# Patient Record
Sex: FEMALE | Race: WHITE
[De-identification: names, ages, dates, MRNs, and addresses within clinical notes are randomized per-mention and may not be internally consistent; named-entity substitution may affect disease eponyms.]

---

## 2018-04-26 ENCOUNTER — HOSPITAL ENCOUNTER (INPATIENT)
Dept: HOSPITAL 12 - SRC | Age: 23
LOS: 3 days | Discharge: OTHER INSTITUTION | DRG: 895 | End: 2018-04-29
Attending: INTERNAL MEDICINE | Admitting: INTERNAL MEDICINE
Payer: COMMERCIAL

## 2018-04-26 ENCOUNTER — HOSPITAL ENCOUNTER (EMERGENCY)
Dept: HOSPITAL 12 - ER | Age: 23
LOS: 1 days | Discharge: HOME | End: 2018-04-27
Payer: COMMERCIAL

## 2018-04-26 VITALS — WEIGHT: 190 LBS | BODY MASS INDEX: 28.14 KG/M2 | HEIGHT: 69 IN

## 2018-04-26 VITALS — BODY MASS INDEX: 27.4 KG/M2 | WEIGHT: 185 LBS | HEIGHT: 69 IN

## 2018-04-26 DIAGNOSIS — Z88.5: ICD-10-CM

## 2018-04-26 DIAGNOSIS — Z88.8: ICD-10-CM

## 2018-04-26 DIAGNOSIS — Z91.89: ICD-10-CM

## 2018-04-26 DIAGNOSIS — F15.10: ICD-10-CM

## 2018-04-26 DIAGNOSIS — F17.210: ICD-10-CM

## 2018-04-26 DIAGNOSIS — Z80.3: ICD-10-CM

## 2018-04-26 DIAGNOSIS — Z80.1: ICD-10-CM

## 2018-04-26 DIAGNOSIS — Z79.899: ICD-10-CM

## 2018-04-26 DIAGNOSIS — Z59.1: ICD-10-CM

## 2018-04-26 DIAGNOSIS — F41.9: ICD-10-CM

## 2018-04-26 DIAGNOSIS — Z81.1: ICD-10-CM

## 2018-04-26 DIAGNOSIS — Z88.6: ICD-10-CM

## 2018-04-26 DIAGNOSIS — Z91.5: ICD-10-CM

## 2018-04-26 DIAGNOSIS — F13.10: ICD-10-CM

## 2018-04-26 DIAGNOSIS — F31.9: ICD-10-CM

## 2018-04-26 DIAGNOSIS — R10.11: ICD-10-CM

## 2018-04-26 DIAGNOSIS — G40.919: ICD-10-CM

## 2018-04-26 DIAGNOSIS — Z91.018: ICD-10-CM

## 2018-04-26 DIAGNOSIS — Z98.2: ICD-10-CM

## 2018-04-26 DIAGNOSIS — Z88.1: ICD-10-CM

## 2018-04-26 DIAGNOSIS — I15.9: ICD-10-CM

## 2018-04-26 DIAGNOSIS — F12.20: ICD-10-CM

## 2018-04-26 DIAGNOSIS — Z59.0: ICD-10-CM

## 2018-04-26 DIAGNOSIS — F13.230: Primary | ICD-10-CM

## 2018-04-26 DIAGNOSIS — F12.10: ICD-10-CM

## 2018-04-26 DIAGNOSIS — F90.9: ICD-10-CM

## 2018-04-26 DIAGNOSIS — R10.11: Primary | ICD-10-CM

## 2018-04-26 PROCEDURE — A4663 DIALYSIS BLOOD PRESSURE CUFF: HCPCS

## 2018-04-26 PROCEDURE — 80076 HEPATIC FUNCTION PANEL: CPT

## 2018-04-26 PROCEDURE — 83690 ASSAY OF LIPASE: CPT

## 2018-04-26 PROCEDURE — 81001 URINALYSIS AUTO W/SCOPE: CPT

## 2018-04-26 PROCEDURE — 96375 TX/PRO/DX INJ NEW DRUG ADDON: CPT

## 2018-04-26 PROCEDURE — A9150 MISC/EXPER NON-PRESCRIPT DRU: HCPCS

## 2018-04-26 PROCEDURE — 96361 HYDRATE IV INFUSION ADD-ON: CPT

## 2018-04-26 PROCEDURE — 99284 EMERGENCY DEPT VISIT MOD MDM: CPT

## 2018-04-26 PROCEDURE — 36415 COLL VENOUS BLD VENIPUNCTURE: CPT

## 2018-04-26 PROCEDURE — G0480 DRUG TEST DEF 1-7 CLASSES: HCPCS

## 2018-04-26 PROCEDURE — 84703 CHORIONIC GONADOTROPIN ASSAY: CPT

## 2018-04-26 PROCEDURE — 80048 BASIC METABOLIC PNL TOTAL CA: CPT

## 2018-04-26 PROCEDURE — 96374 THER/PROPH/DIAG INJ IV PUSH: CPT

## 2018-04-26 PROCEDURE — 85025 COMPLETE CBC W/AUTO DIFF WBC: CPT

## 2018-04-26 SDOH — ECONOMIC STABILITY - HOUSING INSECURITY: HOMELESSNESS: Z59.0

## 2018-04-26 SDOH — ECONOMIC STABILITY - HOUSING INSECURITY: INADEQUATE HOUSING: Z59.1

## 2018-04-27 ENCOUNTER — HOSPITAL ENCOUNTER (OUTPATIENT)
Dept: HOSPITAL 12 - RAD | Age: 23
Discharge: HOME | End: 2018-04-27
Attending: INTERNAL MEDICINE
Payer: COMMERCIAL

## 2018-04-27 VITALS — DIASTOLIC BLOOD PRESSURE: 53 MMHG | SYSTOLIC BLOOD PRESSURE: 97 MMHG

## 2018-04-27 VITALS — DIASTOLIC BLOOD PRESSURE: 90 MMHG | SYSTOLIC BLOOD PRESSURE: 142 MMHG

## 2018-04-27 VITALS — DIASTOLIC BLOOD PRESSURE: 75 MMHG | SYSTOLIC BLOOD PRESSURE: 129 MMHG

## 2018-04-27 VITALS — SYSTOLIC BLOOD PRESSURE: 122 MMHG | DIASTOLIC BLOOD PRESSURE: 62 MMHG

## 2018-04-27 DIAGNOSIS — K82.0: ICD-10-CM

## 2018-04-27 DIAGNOSIS — J98.11: ICD-10-CM

## 2018-04-27 DIAGNOSIS — G93.89: ICD-10-CM

## 2018-04-27 DIAGNOSIS — N83.201: ICD-10-CM

## 2018-04-27 DIAGNOSIS — R90.82: Primary | ICD-10-CM

## 2018-04-27 LAB
ALP SERPL-CCNC: 91 U/L (ref 50–136)
ALP SERPL-CCNC: 91 U/L (ref 50–136)
ALT SERPL W/O P-5'-P-CCNC: 17 U/L (ref 14–59)
ALT SERPL W/O P-5'-P-CCNC: 19 U/L (ref 14–59)
AMPHETAMINES UR QL SCN>1000 NG/ML: NEGATIVE
APPEARANCE UR: CLEAR
AST SERPL-CCNC: 8 U/L (ref 15–37)
AST SERPL-CCNC: 9 U/L (ref 15–37)
BARBITURATES UR QL SCN: NEGATIVE
BASOPHILS # BLD AUTO: 0.1 K/UL (ref 0–8)
BASOPHILS # BLD AUTO: 0.1 K/UL (ref 0–8)
BASOPHILS NFR BLD AUTO: 0.8 % (ref 0–2)
BASOPHILS NFR BLD AUTO: 0.9 % (ref 0–2)
BILIRUB DIRECT SERPL-MCNC: 0.1 MG/DL (ref 0–0.2)
BILIRUB SERPL-MCNC: 0.2 MG/DL (ref 0.2–1)
BILIRUB SERPL-MCNC: 0.2 MG/DL (ref 0.2–1)
BILIRUB UR QL STRIP: NEGATIVE
BUN SERPL-MCNC: 14 MG/DL (ref 7–18)
BUN SERPL-MCNC: 17 MG/DL (ref 7–18)
CHLORIDE SERPL-SCNC: 105 MMOL/L (ref 98–107)
CHLORIDE SERPL-SCNC: 107 MMOL/L (ref 98–107)
CO2 SERPL-SCNC: 27 MMOL/L (ref 21–32)
CO2 SERPL-SCNC: 27 MMOL/L (ref 21–32)
COCAINE UR QL SCN: NEGATIVE
COLOR UR: (no result)
CREAT SERPL-MCNC: 1 MG/DL (ref 0.6–1.3)
CREAT SERPL-MCNC: 1.1 MG/DL (ref 0.6–1.3)
DEPRECATED SQUAMOUS URNS QL MICRO: (no result) /HPF
EOSINOPHIL # BLD AUTO: 0.1 K/UL (ref 0–0.7)
EOSINOPHIL # BLD AUTO: 0.2 K/UL (ref 0–0.7)
EOSINOPHIL NFR BLD AUTO: 1.2 % (ref 0–7)
EOSINOPHIL NFR BLD AUTO: 2.3 % (ref 0–7)
ETHANOL SERPL-MCNC: < 3 MG/DL (ref 0–0)
GLUCOSE SERPL-MCNC: 101 MG/DL (ref 74–106)
GLUCOSE SERPL-MCNC: 115 MG/DL (ref 74–106)
GLUCOSE UR STRIP-MCNC: NEGATIVE MG/DL
HCG UR QL: NEGATIVE
HCG UR QL: NEGATIVE
HCT VFR BLD AUTO: 37.1 % (ref 31.2–41.9)
HCT VFR BLD AUTO: 40.2 % (ref 31.2–41.9)
HGB BLD-MCNC: 12.4 G/DL (ref 10.9–14.3)
HGB BLD-MCNC: 13.5 G/DL (ref 10.9–14.3)
HGB UR QL STRIP: NEGATIVE
KETONES UR STRIP-MCNC: NEGATIVE MG/DL
LEUKOCYTE ESTERASE UR QL STRIP: NEGATIVE
LIPASE SERPL-CCNC: 76 U/L (ref 73–393)
LYMPHOCYTES # BLD AUTO: 1.9 K/UL (ref 20–40)
LYMPHOCYTES # BLD AUTO: 2.2 K/UL (ref 20–40)
LYMPHOCYTES NFR BLD AUTO: 20.1 % (ref 20.5–51.5)
LYMPHOCYTES NFR BLD AUTO: 22.6 % (ref 20.5–51.5)
MAGNESIUM SERPL-MCNC: 1.9 MG/DL (ref 1.8–2.4)
MCH RBC QN AUTO: 28.2 UUG (ref 24.7–32.8)
MCH RBC QN AUTO: 28.4 UUG (ref 24.7–32.8)
MCHC RBC AUTO-ENTMCNC: 33 G/DL (ref 32.3–35.6)
MCHC RBC AUTO-ENTMCNC: 34 G/DL (ref 32.3–35.6)
MCV RBC AUTO: 84.4 FL (ref 75.5–95.3)
MCV RBC AUTO: 84.6 FL (ref 75.5–95.3)
MONOCYTES # BLD AUTO: 0.7 K/UL (ref 2–10)
MONOCYTES # BLD AUTO: 0.9 K/UL (ref 2–10)
MONOCYTES NFR BLD AUTO: 7.9 % (ref 0–11)
MONOCYTES NFR BLD AUTO: 8.6 % (ref 0–11)
NEUTROPHILS # BLD AUTO: 5.5 K/UL (ref 1.8–8.9)
NEUTROPHILS # BLD AUTO: 7.6 K/UL (ref 1.8–8.9)
NEUTROPHILS NFR BLD AUTO: 65.7 % (ref 38.5–71.5)
NEUTROPHILS NFR BLD AUTO: 69.9 % (ref 38.5–71.5)
NITRITE UR QL STRIP: NEGATIVE
OPIATES UR QL SCN: POSITIVE
PCP UR QL SCN>25 NG/ML: NEGATIVE
PH UR STRIP: 5.5 [PH] (ref 5–8)
PLATELET # BLD AUTO: 232 K/UL (ref 179–408)
PLATELET # BLD AUTO: 247 K/UL (ref 179–408)
POTASSIUM SERPL-SCNC: 3.8 MMOL/L (ref 3.5–5.1)
POTASSIUM SERPL-SCNC: 3.9 MMOL/L (ref 3.5–5.1)
PROT UR QL STRIP: NEGATIVE
RBC # BLD AUTO: 4.4 MIL/UL (ref 3.63–4.92)
RBC # BLD AUTO: 4.76 MIL/UL (ref 3.63–4.92)
RBC #/AREA URNS HPF: (no result) /HPF (ref 0–3)
SP GR UR STRIP: 1.01 (ref 1–1.03)
THC UR QL SCN>50 NG/ML: POSITIVE
TSH SERPL DL<=0.005 MIU/L-ACNC: 2.32 MIU/ML (ref 0.36–3.74)
UROBILINOGEN UR STRIP-MCNC: 0.2 E.U./DL
WBC # BLD AUTO: 10.8 K/UL (ref 3.8–11.8)
WBC # BLD AUTO: 8.4 K/UL (ref 3.8–11.8)
WBC #/AREA URNS HPF: (no result) /HPF
WBC #/AREA URNS HPF: (no result) /HPF (ref 0–3)
WS STN SPEC: 6.8 G/DL (ref 6.4–8.2)
WS STN SPEC: 7.6 G/DL (ref 6.4–8.2)

## 2018-04-27 PROCEDURE — HZ2ZZZZ DETOXIFICATION SERVICES FOR SUBSTANCE ABUSE TREATMENT: ICD-10-PCS | Performed by: INTERNAL MEDICINE

## 2018-04-27 RX ADMIN — QUETIAPINE SCH MG: 200 TABLET, FILM COATED ORAL at 21:33

## 2018-04-27 RX ADMIN — DIVALPROEX SODIUM SCH MG: 500 TABLET, DELAYED RELEASE ORAL at 21:33

## 2018-04-27 RX ADMIN — FOLIC ACID SCH MG: 1 TABLET ORAL at 10:12

## 2018-04-27 RX ADMIN — THERA TABS SCH UDTAB: TAB at 10:12

## 2018-04-27 NOTE — NUR
IV INSERT

Nurses from ER insert 20 gauge IV to left forearm for CT of abdomen/pelvis with contrast. 
Site is intact and patent.

## 2018-04-27 NOTE — NUR
Per [facility] staff, they will be sending and "intake" person soon to ER to take 
patient to [facility].  i ordered a breakfast tray for her.

## 2018-04-27 NOTE — NUR
Patient is sleeping but arouses easy.  Per Dr Ji and Dr Dean, pt to go 
back to [facility].  I will call and arrange for admission.

## 2018-04-27 NOTE — NUR
Patient refused tylenol, reports "cannot swallow any pills at the moment" 
requests benadryl". MD notified.

## 2018-04-27 NOTE — NUR
One time Benadryl 50mg IV administered to ease anxiety caused by pending CT scan of the 
abdomen w/wo contrast, Ct head w/o contrast. Primary nurse to reassess.

## 2018-04-27 NOTE — NUR
START OF SHIFT NOTE:

Patient is a 22 year old female admitted today, on 04/27/2018, for withdrawal under medical 
supervision from Benzodiazepines/Xanax and Methamphetamine.  Patient continues ordered PRN 
Medications.  The patient reports Allergy to "Morphine, Imitrex, and Vancomycin ".  Patient 
states"I have allergy to Compactin, Coconut, and Mushrooms".  She is on Full Code, Regular 
Diet, Fall and Seizures precautions.  Past Medical History: Anxiety, Bipolar Depression, 
Hydrocephalus w/Shunt in place, Peripheral Neuropathy, History of seizures.  "Last Seizure 
was 6 months ago".  Surgical History: 10 surgeries for Malfunctioned Shunt, 2 abdominal 
surgeries.  She resting  in the bed.  Patient is alert and oriented x2: patient is not 
oriented to time, and situation.  CT Head Scan  without contrast  done today, as ordered, 
and results placed in chart.  CT scan of Abdomen/Pelvis without contrast done as ordered.  
Results are pending.  Last CIWA = 14 at 1814 per outgoing day shift nurse report: Patient 
presented with anxiety, agitation, nervousness, headache, nasal congestion, tremors, nausea, 
abdominal cramps, generalized body aches, restlessness, and fatigue.  She is appears worry, 
sad with poor eye contact, and  with feelings of loneliness.   Encouraged expresses her 
feeling, and reassuring provided.  The patient  noted disheveled, unkempt, and uncombed.  
Education for hygiene and safety provided.  Patient verbalized understanding.   VSWNL.   
Respirations are even and unlabored.  Patient denies chest pain and cough.  Abdomen is soft, 
non-tender.  Bowel Sounds are active in all 4 quadrants.  Skin is intact, warm, and dry to 
touch.  DVT Pump applied when patient in the bed.  Zofran 4 mg SL administrated PRN for 
nausea,  Ativan 2 mg PO  administrated PRN for CIWA=16, as ordered at 1510, were effective.  
Encouraged to intake fluids as tolerated.  Encouraged to attend groups activities.  Safety 
and calm environment provided.  All needs met.  Safety measures in place: Call Light within 
reach, bed locked in lowest position, padded bed rails up bilaterally.   Patient endorsed by 
outgoing day shift nurse.   Will continue to monitor closely.

## 2018-04-27 NOTE — NUR
NSG NOTE

Pt removed IV on left forearm, Pt states she does not remember removing the IV. No bleeding 
is noted. Will continue to monitor.

## 2018-04-27 NOTE — NUR
PRN RE-ASSESSMENT

Zofran 4mg SL and Ativan 2mg PO PRN was effective. Pt is currently in bed sleeping with 
respirations even and unlabored. CIWA score is deferred at this time due to pt was sleeping. 
Safety precautions observed. Call light is within reach. Will continue to monitor.

## 2018-04-27 NOTE — NUR
PRN GIVEN

Pt was educated in regards to medication regimen and the protocols of the unit by REJI Bhatt. Pt verbalized understanding and agreed to take Ativan 2mg PO and Zofran 4mg Sl PRN for 
s/s of w/d. CIWA score was 16. Ativan 2mg PO PRN and Zofran 4mg PO PRN was given. Medication 
was laura well. Encoruaged increase fluid intake. Will continue to monitor.

## 2018-04-27 NOTE — NUR
PRE-ADMISSION

Pt is a 22 yr old female, pt is alert and oriented to person and place. Pt is presenting 
herself to [facility] for Xanax use and Norco use. Pt states she also takes Soma and 
Meth but last use was 2 months ago. Pt is noted with flat affect and with slurred speech. Pt 
was previously in the ER of [facility] for abdominal pain. Pt states of still having 
abdominal pain 8/10 and a headache 9/10. Facial grimacing is observed and facial flushed. Pt 
remains with IV on left forearm. IV site is intact and patent. VS are 142/90, P 115, R 18, T 
98.2, O2 99%.  Pt will be admitted to the 3rd floor. Will continue to monitor.

## 2018-04-27 NOTE — NUR
ADMISSION NOTE

Pt is a 22 yr old female, AA&Ox2. Pt is noted with periods of forgetfulness and can note 
recall date and time. Pt is presenting herself to [facility] for Benzo and Opiate 
withdrawal. Pt arrived during the night but was send to the ER for c/o severe abdominal 
cramping. Pt received Morphine IV and diphenhydramine IV for pain and was discharged back to 
[facility]. Pt continues to c/o abdominal cramping 8/10 and headache 9/10. Facial 
grimacing and facial flushed is noted. Pt is noted with slurred speech. Body check was 
complete. Skin is intact, warm and moist to touch. Slight tremors are seen. Pt c/o muscle 
aching, nausea and sweats. Pt is a poor historian and is giving mix information in regards 
to use/PMH and Allergies. Pt states she has allergies to Compazine, Morphine, Vanco, Imitrex 
Mushrooms and Coconuts during intake. Pt was offered Toradol 30mg IM  for pain and Ativan 
2mg PO PRN for CIWA score of 14 at 1100. Pt refused to take Ativan and states, "Ativan makes 
me feel sick" and refused Toradol stating she has allergies to Toradol.  Pt states of PMH of 
Hydrocephalus with Shunt in place, Peripheral Neuropathy and Bipolar Depression. Pt states 
she had multiple Intracranial surgery and V/P Shunt replacement and revisions. Pt states 
last Surgery was 7 months ago.  Pt also states of Hx of Withdrawal-induced Sz, last episode 
was 6 months ago. Pt states of taking home medication Keppra (Unknown amount) for seizures, 
Depakote for Bipolar Disorder and Zofran for nausea. Pt brought home medication. Pt is on 
fall and seizure precautions. 

Substance Hx:

1. Xanax  Pt states of taking Xanax for the past 5 yrs. Pt states of taking 12-24mg PO 
daily. Pt states she was in recovery center from March 30 to April 25 and relapsed for 2 
days. Pt states last use was on 4/26/18, pt states of taking 7-9mg PO. 

2. Norco  Pt states of taking Norco for 5 years. Pt states of taking 12 (10mg) daily PO. 
Last use was 1 week ago, Pt states of taking 5 tabs(10mg). 

3. Soma  pt states of taking Soma 4 yrs ago. Pt states of taking 6 tabs daily. Last use was 
4 months ago, Pt states of taking 6 tabs

4. Meth  Pt states of taking Meth for the past 6 months. Pt would take 4g IV/Smoke/snort 
daily. Last use was 2 months, pt states of taking 4g smoke. 

5. Marijuana - Pt states of smoking occasionally. 

Pt states she would use substances to numb her anxiety and depression. Pt states the 
substances has impacted her living situation stating, "I have nowhere to go" and states of 
"hanging with the wrong crowd". Pt states of wanting to stop using benzos and maintain a 
sober lifestyle. 

Pt was seen and examined by Dr. Dean. Pt is on PRNs for s/s of w/d. Pt was educated on 
medication regimen and plan of care. Further education is needs. Pt was oriented around unit 
and equipment in room. Will continue to monitor.

## 2018-04-27 NOTE — NUR
NSG NOTES

Pt's IV on her left forearm was infiltrated and removed. minimal bleeding was noted. Site 
was kept clean and dry. Pt was noted with increase anxiety and agitation due to medication 
regimen.  Pt is noted with sweats and facial redness. Pt c/o headache. CIWA score was 16. Pt 
was offered Ativan 2mg PO PRN for CIWA >12. Pt refused to administer. Pt states, "I'll throw 
up, I throw up after I take benzo's" Pt was offered Zofran to take with Ativan PRN, Pt was 
educated on medication regimen. Pt needs further education. Pt states, "I need Ativan IV". 
Pt was educated again on medication regimen. Pt needs further education. Will continue to 
monitor.

## 2018-04-27 NOTE — NUR
END OF SHIFT

Pt is a 22 yr old female, AA&Ox2. Pt was noted with periods of forgetfulness. Pt was 
admitted for Benzo/Opiate withdrawal and is on PRNs for s/s of w/d. Pt was noted with 
increase anxiety with agitation m/b difficulty staying still. Pt is noted with flat affect 
and facial redness.  Pt c/o abdominal cramping and headache 8/10, Toradol PRN was offered 
but pt refused to take, stating she is allergic to Toradol and can take it with Benadryl IV 
PRN. Pt was given Ativan 2mg PO PRN for CIWA of 16 and Zofran 4mg SL PRN for nausea at 1509. 
Medication was effective. Pt was observed sleeping after 1 hour. Last CIWA score was 14 at 
1814. Pt received a CT scan of head and abdomen/pelvis without contrast. CT of 
Abdomen/pelvis is still pending. Endorsed to night shift nurse to f/u. Pt is on fall and 
seizure precautions. Call light is within reach.

## 2018-04-28 VITALS — DIASTOLIC BLOOD PRESSURE: 71 MMHG | SYSTOLIC BLOOD PRESSURE: 114 MMHG

## 2018-04-28 VITALS — SYSTOLIC BLOOD PRESSURE: 132 MMHG | DIASTOLIC BLOOD PRESSURE: 94 MMHG

## 2018-04-28 VITALS — DIASTOLIC BLOOD PRESSURE: 92 MMHG | SYSTOLIC BLOOD PRESSURE: 142 MMHG

## 2018-04-28 VITALS — SYSTOLIC BLOOD PRESSURE: 136 MMHG | DIASTOLIC BLOOD PRESSURE: 96 MMHG

## 2018-04-28 VITALS — DIASTOLIC BLOOD PRESSURE: 77 MMHG | SYSTOLIC BLOOD PRESSURE: 141 MMHG

## 2018-04-28 VITALS — SYSTOLIC BLOOD PRESSURE: 119 MMHG | DIASTOLIC BLOOD PRESSURE: 72 MMHG

## 2018-04-28 PROCEDURE — HZ41ZZZ GROUP COUNSELING FOR SUBSTANCE ABUSE TREATMENT, BEHAVIORAL: ICD-10-PCS

## 2018-04-28 RX ADMIN — THERA TABS SCH UDTAB: TAB at 08:33

## 2018-04-28 RX ADMIN — QUETIAPINE SCH MG: 200 TABLET, FILM COATED ORAL at 22:05

## 2018-04-28 RX ADMIN — GABAPENTIN SCH MG: 300 CAPSULE ORAL at 22:04

## 2018-04-28 RX ADMIN — FOLIC ACID SCH MG: 1 TABLET ORAL at 08:33

## 2018-04-28 RX ADMIN — DIVALPROEX SODIUM SCH MG: 500 TABLET, DELAYED RELEASE ORAL at 08:33

## 2018-04-28 RX ADMIN — Medication SCH MG: at 08:33

## 2018-04-28 RX ADMIN — DIVALPROEX SODIUM SCH MG: 500 TABLET, DELAYED RELEASE ORAL at 22:03

## 2018-04-28 RX ADMIN — GABAPENTIN SCH MG: 300 CAPSULE ORAL at 14:24

## 2018-04-28 RX ADMIN — PROPRANOLOL HYDROCHLORIDE SCH MG: 20 TABLET ORAL at 22:04

## 2018-04-28 NOTE — NUR
End of shift- 

Pt 22 year old female admitted for withdrawal from  Benzodiazepines/Xanax and 
Methamphetamine.    The patient reports Allergy to Morphine, Prochlorperazine, and 
Vancomycin, Sumatriptan, Coconut, and Mushrooms.  The patient anxious, somatic, histrionic, 
sad with poor eye contact.  Pt c/o abdominal cramping and headache 10/10, Toradol PRN was 
offered but pt refused to take, stating she is allergic to Toradol and requested morphine or 
Dialudid. Pt report pain resolved with PRN dose of Ativan and scheduled dose of gabapentin. 
Encouraged pt to expresses her feelings, and reassuring provided.  Encourage pt to attend 
groups to learn/develop new copings kills to prevent relapse. Pt scheduled for discharge 
tomorrow.  The last CIWA 8.  PRN Ativan and Motrin administered per MD orders.  Encouraged 
to intake fluids as tolerated.   Safety and calm environment provided.  Encouraged pt to 
attend groups to learn/develop new copings kills to prevent relapse. Pt FULL CODE. Adequate 
PO fluid intake 1947ml, void X 3, BM x 1. Safety measures in place. Will continue to closely 
monitor s/s of withdrawal. Endorsed to PM shift.

## 2018-04-28 NOTE — NUR
REASSESS ABDOMINAL PAIN- After gabapentin and Ativan stomach pain now #5/10. Pt reports less 
anxiety, able to ambulate to patio to smoke, VS improved after Inderal.

## 2018-04-28 NOTE — NUR
END OF SHIFT NOTE:

Endorsed 22 year old female presented for withdrawal from  Benzodiazepines/Xanax and 
Methamphetamine.    Patient continues ordered  Medications.  Patient remains compliant with 
treatment, medications, and diet regime.  The patient reports Allergy to "Morphine, 
Prochlorperazine, Toradol, and Vancomycin, Sumatriptan, Coconut, and Mushrooms ".  Patient 
is alert and oriented x3: patient is not oriented to time.  The patient appears anxious, sad 
with poor eye contact.  Encouraged expresses her feeling, and reassuring provided.  
Education provided to use of Relaxation Techniques: Deep breathing exercises, guided 
imagery, and  visualization.  She is noted disheveled, unkempt, and uncombed.  Education for 
hygiene and safety provided.  Patient verbalized understanding.   The most recent CIWA = 11 
at 0000: Patient presented with  anxiety, agitation, depression, nervousness, nasal 
congestion, tremors, abdominal cramps,  restlessness, and fatigue.  VSWNL.  Respirations are 
even and unlabored.  Patient denies chest pain and cough.  Skin is intact, warm, and dry to 
touch.  DVT Pump applied when patient in the bed.  No PRN Medications administrated during 
night shift.   Patient sleep 6 hours, Intake: 1000 ml, Output: Voided  x2.  Encouraged to 
intake fluids as tolerated.   Patient attended groups activities.  Safety and calm 
environment provided.  Patient slept 9 hours, intake 0 ml, voided x0.  Encouraged to fluid 
intake as tolerated.  All needs met.  Safety measures in the place: Call light within reach, 
 bed in the lowest position and locked, padded rails up x2.  Patient endorsed to day shift 
nurse.

## 2018-04-28 NOTE — NUR
Start of shift- Pt 22 year old female admitted for withdrawal from  Benzodiazepines/Xanax 
and Methamphetamine.    The patient reports Allergy to "Morphine, Prochlorperazine, Toradol, 
and Vancomycin, Sumatriptan, Coconut, and Mushrooms ".  Patient is alert and oriented x3: 
patient is not oriented to time.  The patient appears anxious, sad with poor eye contact.  
Encouraged expresses her feeling, and reassuring provided.  She is disheveled, unkempt, and 
uncombed.  The last CIWA = 11 at 2400.  DVT Pump applied when patient in the bed.  No PRN 
Medications administrated during night shift.   Patient sleep 6 hours. Encouraged to intake 
fluids as tolerated.   Safety and calm environment provided.  Patient slept 9 hours. Will 
continue to encourage pt to attend groups to learn/develop new copings kills to prevent 
relapse. Safety measures in place. Will continue to closely monitor s/s of withdrawal.

## 2018-04-28 NOTE — NUR
VS REFUSED, CIWA DEFERRED

VS refused, CIWA deferred at 0400 d/t pt sleeping, to assess while pt is awake as ordered.  
All needs met.  Safe and calm environment with minimized noises was provided.  Safety 
measures on place.  Call light within reach, bed in lowest position locked, padded rails up 
bilaterally.  Will continue to monitor closely.

## 2018-04-28 NOTE — NUR
REASSESS SHERLY, PT STATES NECK PAIN NOW #7/10, MEDICATION SLIGHTLY EFFECTIVE

REASSESS MAURISIO IBARRA 10 PT REPORTS SHE STILL HAS ANXIETY alone

## 2018-04-28 NOTE — NUR
ABDOMINAL PAIN- Pt c/o sharp ABD pain #10/10. Dr. Dean notified. Dr. Dean authorized 
Toradol 30 mg IM even though Pt had Motrin 3 hours prior. Pt declined Toradol and requested 
morphine or Dilaudid. Pt had bowel movement this am, soft brown formed. /77, , 
sat 100%, Temp 98.1

-------------------------------------------------------------------------------

Addendum: 04/28/18 at 1140 by Nallely Tripathi RN

-------------------------------------------------------------------------------

Dr. Dean ordered Ativan 2 mg PO now, Gabapentin 300 mg po now, and Inderal 20 mg po for 
elevated BP and heart rate. Pt expressed she was pleased and relieved with the Ativan order. 
Pt able to walk to snack room with steady gait afterwwards.

## 2018-04-29 VITALS — DIASTOLIC BLOOD PRESSURE: 69 MMHG | SYSTOLIC BLOOD PRESSURE: 108 MMHG

## 2018-04-29 VITALS — SYSTOLIC BLOOD PRESSURE: 108 MMHG | DIASTOLIC BLOOD PRESSURE: 69 MMHG

## 2018-04-29 RX ADMIN — GABAPENTIN SCH MG: 300 CAPSULE ORAL at 09:02

## 2018-04-29 RX ADMIN — THERA TABS SCH UDTAB: TAB at 09:02

## 2018-04-29 RX ADMIN — Medication SCH MG: at 09:02

## 2018-04-29 RX ADMIN — PROPRANOLOL HYDROCHLORIDE SCH MG: 20 TABLET ORAL at 09:02

## 2018-04-29 RX ADMIN — FOLIC ACID SCH MG: 1 TABLET ORAL at 09:02

## 2018-04-29 RX ADMIN — DIVALPROEX SODIUM SCH MG: 500 TABLET, DELAYED RELEASE ORAL at 09:03

## 2018-04-29 NOTE — NUR
END OF SHIFT NOTE 

PATIENT SLEPT 5 HOURS. FLUID INTAKE  1,150 ML. VOIDED X  3. NO BM. MONITORED PATIENT 
THROUGHOUT SHIFT. PATIENT IS DISCHARGING TODAY.  PATIENT COMPLIANT WITH MEDICATION AND 
TREATMENT PLAN. MEDICATION GIVEN AS ORDERED, TOLERATED WELL AND NO ADVERSE REACTION. PATIENT 
 WAS ANXIOUS AND RESTLESS BEGINNING OF SHIFT.  PATIENT DID NOT REQUIRE PRN MEDICATION.  
PATIENT DID NOT C/O ABDOMINAL PAIN DURING SHIFT. SAFETY MEASURES IN PLACE. CALL LIGHT IN 
REACH.WILL CONTINUE TO MONITOR. LAST CIWA 8.

## 2018-04-29 NOTE — NUR
START OF SHIFT



PT IS A 23 Y/O F ADMITTED FOR MEDICALLY SUPERVISED BENZO, OPIATE, METH WITHDRAWAL. RECEIVED 
PT A/O X4, RESPIRATION AND UNLABORED. PT HAS A FLAT AFFECT, HAS POOR EYE CONTACT AND 
DISHEVELED APPEARANCE. PT PRESENTS AGITATION AND ANXIETY. PT STATES SHE IS READY TO LEAVE 
[facility] BUT HAS ANXIETY ABOUT "WHAT'S TO COME." PT IS TO BE DISCHARGED TO DAY. LAST CO SIDE 
RAILS UPX2, BED IN LOW POSITION. CALL LIGHT WITHIN REACH. SAFETY MEASURES IN PLACE. WILL 
MONITOR UNTIL DISCHARGE.

## 2018-04-29 NOTE — NUR
CIWA DEFERRED

PATIENT SLEEPING.  RESPIRATION EVEN AND UNLABORED. VS REFUSED . SAFETY MEASURES IN PLACE. 
WILL CONTINUE TO MONITOR

## 2018-04-29 NOTE — NUR
DISCHARGE NOTE



PT LEFT THE UNIT IN STABLE CONDITION, VS WNL, AT 0927 ON 04/28/18. PT HAS BEEN EDUCATED WITH 
DISCHARGED INSTRUCTIONS AND PT VERBALIZED UNDERSTANDING. LAST CIWA 5. MD AWARE OF PT'S 
DISCHARGE. PT LEFT THE BUILDING WITH ALL BELONGINGS, PRESCRIPTIONS. PT HAS BEEN PICKED UP BY 
[facility].

## 2018-11-24 NOTE — NUR
IVPB Reglan was discontinued per MD.  Mild redness to left arm noted, no hives 
seen, no wheezes heard.

## 2018-11-24 NOTE — NUR
Patient ambulated to bathroom 2x with brisk steady gait.  CT results are in, 
for disposition@ this time, no vomiting episodes while in ER seen. Patient is 
frequently requesting for her IV fluids to be flushed.  "I just want to make 
sure my Benadryl is pushed in." per patient's verbalization.

## 2018-11-24 NOTE — NUR
Patient is back from CT scan, c/o pains & redness to left arm.  IV fluids is 
infusing well, MD notified.

## 2018-11-24 NOTE — NUR
Patient is demanding loudly for another dose of IV Benadryl, MD notified.  
Charge RN Tono notified.

## 2018-11-24 NOTE — NUR
IV removed.  Catheter intact and site benign. Pressure and 4x4 gauze applied to 
site. No bleeding noted.  Patient discharged to home in stable conditon.  
Written and verbal after care instructions given to patient. Patient verbalizes 
understanding of instructions.

## 2018-11-24 NOTE — NUR
"Can you push the Benadryl real fast?" per patient's verbalization.  Explained 
to patient that the common side effects of Benadryl, monitored closely

## 2019-01-04 NOTE — NUR
MSE COMPLETED, IV MEDS AND FLUIDS ADMIN. PT HAD IV D/C'D INTACT, ACI GIVEN, BEL 
AIR [facility] CALED FOR .. PT AMBULATED W/O DIFF.

## 2019-01-24 NOTE — NUR
minimal redness to inner left forearm noted, no wheezes heard, MD notified.  
Patient wants IV Benadryl, MD notified again. No new orders at this time.

## 2019-01-24 NOTE — NUR
IV removed.  Catheter intact and site benign. Pressure and 4x4 gauze applied to 
site. No bleeding noted.  Patient discharged to home in stable conditon & brsik 
steady gait.  Written and verbal after care instructions given to patient. 
Patient verbalizes understanding of instructions.

## 2019-01-24 NOTE — NUR
Patient upset that dilaudid or ativan not ordered and requesting for another attending MD or 
transfer to another hospital where she can receive "better care." Patient informed that she 
has the right to leave the hospital if she wishes to however it will be considered leaving 
against medical advice and that she will be responsible of her well being when she leaves 
the hospital. Patient informed that there is only one on-call doctor tonight and that 
another doctor will come in for the morning at 8 am. Patient asking again if she can get 
dilaudid or ativan, informed her that orders are needed.

## 2019-01-24 NOTE — NUR
Patient is AOx4 & argumentative, Dr Masters is at bedside explaining in details 
re: his recommendation.

## 2019-01-24 NOTE — NUR
Received patient from emergency department via gurney, patient ambulated to transfer to 
hospital bed. With IV access at left wrist, 22g, patent and intact to ongoing IVF, infusing 
well. Noted per Andrews from ED, patient experienced itchiness after zofran injection and was 
given dipenhydramine injection at ED, noted some redness on arms and legs that looked like 
scratch marks. Patient complaining of headache 10/10 and chest pain 6/10. Patient asking 
insistently for dilaudid or morphine and ativan, noted per chart review per Herve Garcia NP, 
patient will not be given opioids and benzodiazepine, patient informed of this. Patient 
currently in stable condition, will continue to monitor.

## 2019-01-25 NOTE — NUR
Discharged per ambulatory in fair condition, not in distress, afebrile. accompanied by 
nursing personnel picked up by [facility] personnel

## 2019-01-25 NOTE — NUR
Patient verbalizing she is in severe pain, patient informed again that doctor did not order 
any dilaudid, morphine or ativan. Noted MD does not wanna give patient any opioids or 
benzodiazepine. Patient informed that hospitalist will make rounds this morning and she can 
discuss medication management with the MD. Patient does not want to take tylenol, states it 
does not help with the pain. Patient also declined to have her vital signs checked this 
morning. Ensured patient safety and comfort.

## 2019-01-25 NOTE — NUR
Patient desires to go home against medical advice when informed that no other pain 
medication was/will be ordered per doctor. Coordinated with Jody VIDAL at [facility] but will not accept patient back if leaving AMA. Spoke with patient and patient 
stayed to wait for doctor's discharge order

## 2019-01-25 NOTE — NUR
EEG machine not functioning, [employer] called, still to be fixed. Tech to come back with 
another machine

## 2019-01-25 NOTE — NUR
RECEIVED PT AWAKE, ALERT, ORIENTEDX4. WITING TO BE DISCHARGE. IV ACCESS TAKEN OUT. PT STABLE 
WITH NO ACUTE DISTRESS. WAITING FOR [facility] TO PICK HER UP.

## 2019-01-25 NOTE — NUR
Currently patient is calm, and verbalized that she will wait for the other doctor who will 
come in the morning. Noted patient currently admitted on telemetry and has the monitor in 
place. Troponin came back negative and patient currently on normal sinus rhythm on tele 
monitor. Will continue to monitor.

## 2019-01-28 NOTE — NUR
PT WAS D/C'd TO HOME. D/C INSTRUCTIONS GIVEN TO THE PT. PT DENIES PAIN. NO S/S 
OF DISTRESS AT THIS TIME. GAIT IS STABLE.

## 2019-02-03 NOTE — NUR
Patient is AAOx4. She is [facility] staff member [facility]. Patient has c/o CP , 
non radiating. States she has been stressed out, anxious, and is currently 
being treated with Diazepam at her detox facility. EKG completed at patient 
arrival. No further complaints or symptoms are noted at this time.

## 2019-02-03 NOTE — NUR
Patient is screaming, stating " I just want the Ativan, give me the drugs". 
Representative from the patients drug rehabilitation facility states she is not 
allowed to take this type of medication during her stay at the Detox. Patient 
is now stating she will leave her detox facility, and wants to be transfered to 
another location. Will continue to monitor patient. Awaiting decision from 
Detox facility and patient regarding her medication/condition management 
options.

## 2019-02-03 NOTE — NUR
Patient discharged to home in stable conditon.  Written and verbal after care 
instructions given. 

Patient verbalizes understanding of instructions. Ambulated from ER with stable 
gait. All belongings with patient. VSS